# Patient Record
Sex: FEMALE | Race: OTHER | HISPANIC OR LATINO | URBAN - METROPOLITAN AREA
[De-identification: names, ages, dates, MRNs, and addresses within clinical notes are randomized per-mention and may not be internally consistent; named-entity substitution may affect disease eponyms.]

---

## 2017-07-17 ENCOUNTER — EMERGENCY (EMERGENCY)
Facility: HOSPITAL | Age: 27
LOS: 1 days | Discharge: HOME | End: 2017-07-17
Attending: EMERGENCY MEDICINE | Admitting: EMERGENCY MEDICINE
Payer: MEDICAID

## 2017-07-17 VITALS
TEMPERATURE: 98 F | RESPIRATION RATE: 18 BRPM | OXYGEN SATURATION: 96 % | HEART RATE: 110 BPM | DIASTOLIC BLOOD PRESSURE: 78 MMHG | SYSTOLIC BLOOD PRESSURE: 125 MMHG

## 2017-07-17 PROCEDURE — 99053 MED SERV 10PM-8AM 24 HR FAC: CPT

## 2017-07-17 PROCEDURE — 99284 EMERGENCY DEPT VISIT MOD MDM: CPT | Mod: 25

## 2017-07-17 PROCEDURE — 76882 US LMTD JT/FCL EVL NVASC XTR: CPT | Mod: 26

## 2017-07-17 RX ORDER — AZTREONAM 2 G
1 VIAL (EA) INJECTION
Qty: 14 | Refills: 0
Start: 2017-07-17 | End: 2017-07-24

## 2017-07-17 RX ORDER — IBUPROFEN 200 MG
600 TABLET ORAL ONCE
Refills: 0 | Status: COMPLETED | OUTPATIENT
Start: 2017-07-17 | End: 2017-07-17

## 2017-07-17 RX ADMIN — Medication 600 MILLIGRAM(S): at 05:57

## 2017-07-17 RX ADMIN — Medication 600 MILLIGRAM(S): at 04:59

## 2017-07-17 RX ADMIN — Medication 1 TABLET(S): at 04:59

## 2017-07-17 NOTE — ED PROVIDER NOTE - MEDICAL DECISION MAKING DETAILS
Sebaceous cyst visualized on bedside US. I&D performed without complication. Abx PPx. f/u general surgery for definitive management.

## 2017-07-17 NOTE — ED PROVIDER NOTE - OBJECTIVE STATEMENT
left thigh bump 3 months c/o 3 month h/o painful persistent bump in left thigh which waxes and wanes in size. No fever. No trauma. No drainage. Able to ambulate. Has not seen a doctor to date for Sx. In ED today b/c bump is more painful than usual.

## 2017-07-17 NOTE — ED ADULT TRIAGE NOTE - CHIEF COMPLAINT QUOTE
Pt. c/o bump to left inner thigh x 5 months; states bump began back in March while she was in the hospital for "psychiatric reasons", but that the nurse "didn't think it was anything". Reports bump has since gotten bigger and smaller periodically, but is now getting "bigger again" and "painful". Denies drainage from site, denies fevers, chills or weakness.

## 2017-07-17 NOTE — ED PROVIDER NOTE - CHIEF COMPLAINT
The patient is a 26y Female complaining of The patient is a 26y Female complaining of left thigh boil.

## 2017-07-17 NOTE — ED PROVIDER NOTE - SKIN, MLM
Skin normal color for race, warm, dry and intact. No evidence of rash. 1 cm red palpable bump in left inner upper tigh without warmth or fluctuance and mild TTP

## 2017-07-26 NOTE — ED PROCEDURE NOTE - PROCEDURE ADDITIONAL DETAILS
Left thigh boil POCUS r/o abscess  1.06 x 0.6 x 0.31 cm anechoic pocket without color flow identified extending from surface into subcutaneous tissue c/w cyst vs abscess  ANTIONE

## 2022-09-09 NOTE — ED PROVIDER NOTE - HEAD, MLM
Head is atraumatic. Head shape is symmetrical. PAST SURGICAL HISTORY:  No significant past surgical history